# Patient Record
Sex: FEMALE | Race: BLACK OR AFRICAN AMERICAN | Employment: FULL TIME | URBAN - METROPOLITAN AREA
[De-identification: names, ages, dates, MRNs, and addresses within clinical notes are randomized per-mention and may not be internally consistent; named-entity substitution may affect disease eponyms.]

---

## 2022-01-17 ENCOUNTER — HOSPITAL ENCOUNTER (EMERGENCY)
Age: 22
Discharge: HOME | End: 2022-01-17
Attending: EMERGENCY MEDICINE

## 2022-01-17 VITALS
RESPIRATION RATE: 20 BRPM | HEIGHT: 68 IN | HEART RATE: 93 BPM | WEIGHT: 243.61 LBS | DIASTOLIC BLOOD PRESSURE: 92 MMHG | BODY MASS INDEX: 36.92 KG/M2 | OXYGEN SATURATION: 98 % | TEMPERATURE: 98 F | SYSTOLIC BLOOD PRESSURE: 141 MMHG
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
## 2022-01-17 DIAGNOSIS — S39.012A STRAIN OF LUMBAR REGION, INITIAL ENCOUNTER: ICD-10-CM

## 2022-01-17 PROCEDURE — 99284 EMERGENCY DEPT VISIT MOD MDM: CPT

## 2022-01-17 PROCEDURE — 6370000000 HC RX 637 (ALT 250 FOR IP): Performed by: EMERGENCY MEDICINE

## 2022-01-17 RX ORDER — ACETAMINOPHEN 325 MG/1
650 TABLET ORAL EVERY 6 HOURS PRN
Qty: 30 TABLET | Refills: 0 | Status: SHIPPED | OUTPATIENT
Start: 2022-01-17

## 2022-01-17 RX ORDER — ACETAMINOPHEN 325 MG/1
650 TABLET ORAL ONCE
Status: COMPLETED | OUTPATIENT
Start: 2022-01-17 | End: 2022-01-17

## 2022-01-17 RX ADMIN — ACETAMINOPHEN 650 MG: 325 TABLET ORAL at 14:43

## 2022-01-17 ASSESSMENT — PAIN SCALES - GENERAL
PAINLEVEL_OUTOF10: 4
PAINLEVEL_OUTOF10: 4
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
## 2024-03-12 ENCOUNTER — OFFICE VISIT (OUTPATIENT)
Age: 24
End: 2024-03-12

## 2024-03-12 VITALS
DIASTOLIC BLOOD PRESSURE: 88 MMHG | BODY MASS INDEX: 37.65 KG/M2 | WEIGHT: 244 LBS | TEMPERATURE: 103 F | SYSTOLIC BLOOD PRESSURE: 128 MMHG | HEART RATE: 124 BPM | OXYGEN SATURATION: 95 %

## 2024-03-12 DIAGNOSIS — J02.0 STREP THROAT: Primary | ICD-10-CM

## 2024-03-12 DIAGNOSIS — J02.9 SORE THROAT: ICD-10-CM

## 2024-03-12 LAB — S PYO AG THROAT QL: POSITIVE

## 2024-03-12 RX ORDER — PREDNISONE 20 MG/1
20 TABLET ORAL DAILY
Qty: 5 TABLET | Refills: 0 | Status: SHIPPED | OUTPATIENT
Start: 2024-03-12 | End: 2024-03-17

## 2024-03-12 RX ORDER — AMOXICILLIN AND CLAVULANATE POTASSIUM 500; 125 MG/1; MG/1
1 TABLET, FILM COATED ORAL 3 TIMES DAILY
Qty: 30 TABLET | Refills: 0 | Status: SHIPPED | OUTPATIENT
Start: 2024-03-12 | End: 2024-03-22

## 2024-03-12 ASSESSMENT — ENCOUNTER SYMPTOMS: SORE THROAT: 1

## 2024-03-12 NOTE — PATIENT INSTRUCTIONS
Thank you for allowing us to care for you today and we hope you feel better soon  Tylenol/Motrin as needed for fever and discomfort  New Prescriptions    AMOXICILLIN-CLAVULANATE (AUGMENTIN) 500-125 MG PER TABLET    Take 1 tablet by mouth 3 times daily for 10 days    PREDNISONE (DELTASONE) 20 MG TABLET    Take 1 tablet by mouth daily for 5 days

## 2024-03-12 NOTE — PROGRESS NOTES
Linda Hunt (:  2000) is a 23 y.o. female,New patient, here for evaluation of the following chief complaint(s):  Pharyngitis (Symptoms started .)      ASSESSMENT/PLAN:    ICD-10-CM    1. Strep throat  J02.0 amoxicillin-clavulanate (AUGMENTIN) 500-125 MG per tablet     predniSONE (DELTASONE) 20 MG tablet      2. Sore throat  J02.9 POCT rapid strep A        Results for POC orders placed in visit on 24   POCT rapid strep A   Result Value Ref Range    Strep A Ag Positive (A) None Detected        Dx Diff:strep, influenza   Education and handout provided on diagnosis and management of symptoms.   AVS reviewed with patient. Follow up as needed in UC or with PCP for new or worsening symptoms.   Return if symptoms worsen or fail to improve.    SUBJECTIVE/OBJECTIVE:  Patient presents today with complaints of sore throat and fever that started       History provided by:  Patient   used: No    Pharyngitis  Location:  Throat  Severity:  Moderate  Onset quality:  Sudden  Duration:  3 days  Timing:  Constant  Progression:  Worsening  Associated symptoms: fever and sore throat        Vitals:    24 1848   BP: 128/88   Site: Right Upper Arm   Position: Sitting   Pulse: (!) 124   Temp: (!) 103 °F (39.4 °C)   TempSrc: Oral   SpO2: 95%   Weight: 110.7 kg (244 lb)       Review of Systems   Constitutional:  Positive for fever.   HENT:  Positive for sore throat.        Physical Exam  Constitutional:       Appearance: Normal appearance.   HENT:      Head: Normocephalic.      Mouth/Throat:      Mouth: Mucous membranes are moist.      Pharynx: Oropharyngeal exudate and posterior oropharyngeal erythema present.   Cardiovascular:      Rate and Rhythm: Regular rhythm. Tachycardia present.      Heart sounds: Normal heart sounds.   Pulmonary:      Effort: Pulmonary effort is normal.      Breath sounds: Normal breath sounds.   Skin:     General: Skin is warm and dry.   Neurological:

## 2024-12-29 ENCOUNTER — OFFICE VISIT (OUTPATIENT)
Age: 24
End: 2024-12-29

## 2024-12-29 VITALS
BODY MASS INDEX: 40.18 KG/M2 | HEART RATE: 81 BPM | OXYGEN SATURATION: 98 % | HEIGHT: 67 IN | SYSTOLIC BLOOD PRESSURE: 124 MMHG | DIASTOLIC BLOOD PRESSURE: 83 MMHG | TEMPERATURE: 98.2 F | WEIGHT: 256 LBS

## 2024-12-29 DIAGNOSIS — K21.9 GASTROESOPHAGEAL REFLUX DISEASE, UNSPECIFIED WHETHER ESOPHAGITIS PRESENT: ICD-10-CM

## 2024-12-29 DIAGNOSIS — K21.9 GASTROESOPHAGEAL REFLUX DISEASE, UNSPECIFIED WHETHER ESOPHAGITIS PRESENT: Primary | ICD-10-CM
